# Patient Record
Sex: FEMALE | Race: OTHER | HISPANIC OR LATINO | URBAN - METROPOLITAN AREA
[De-identification: names, ages, dates, MRNs, and addresses within clinical notes are randomized per-mention and may not be internally consistent; named-entity substitution may affect disease eponyms.]

---

## 2018-05-26 ENCOUNTER — EMERGENCY (EMERGENCY)
Facility: HOSPITAL | Age: 33
LOS: 1 days | Discharge: HOME | End: 2018-05-26
Admitting: EMERGENCY MEDICINE
Payer: SELF-PAY

## 2018-05-26 VITALS
HEART RATE: 90 BPM | OXYGEN SATURATION: 98 % | SYSTOLIC BLOOD PRESSURE: 132 MMHG | RESPIRATION RATE: 16 BRPM | TEMPERATURE: 98 F | DIASTOLIC BLOOD PRESSURE: 71 MMHG

## 2018-05-26 PROCEDURE — 99283 EMERGENCY DEPT VISIT LOW MDM: CPT

## 2018-05-26 RX ORDER — DICLOXACILLIN SODIUM 500 MG/1
1 CAPSULE ORAL
Qty: 28 | Refills: 0 | OUTPATIENT
Start: 2018-05-26 | End: 2018-06-01

## 2018-05-26 NOTE — ED PROVIDER NOTE - CARE PLAN
Principal Discharge DX:	Mastitis  Assessment and plan of treatment:	Take medication as prescribed. Continue to breast feed. Warm compresses to area. Rest, drink plenty of fluids.  Advance activity as tolerated.  Continue all previously prescribed medications as directed. You can use motrin 600mg every 6-8 hours for pain or fever, and/or Tylenol 650 mg every 4 hours for pain/fever. Follow up with your primary care physician in 48-72 hours- bring copies of your results.  Return to the emergency department for chest pain, shortness of breath, dizziness, or worsening, concerning, or persistent symptoms.

## 2018-05-26 NOTE — ED PROVIDER NOTE - MEDICAL DECISION MAKING DETAILS
33 yo F here for right breast mastitis in setting of breast feeding. well appearing vss. mild mastitis on exam. will dc with abx and gyn fu.

## 2018-05-26 NOTE — ED PROVIDER NOTE - PHYSICAL EXAMINATION
BREAST: right breast with mild erythema, warmth, and swelling 1 cm below the aerola in the 6:00 position, generalized swelling, mild tenderness to area. no discharge or bleeding or skin dimpling.  L breast WNL  chaperone: rn joyce

## 2018-05-26 NOTE — ED ADULT TRIAGE NOTE - CHIEF COMPLAINT QUOTE
Pt. c/o pain under right breast x 3 days. States she is breast feeding. Denies redness or swelling. Took Tylenol for pain.

## 2018-05-26 NOTE — ED PROVIDER NOTE - OBJECTIVE STATEMENT
33 yo F here for right breast pain x 3 days. pt reports she is breast feeding and has been experiencing right breast pain, swelling, redness. noted tactile fever today. denies bleeding/discharge from area. denies chills vomiting diarrhea cp sob weakness ha dizziness.   pt requesting  translate at bedside.

## 2018-05-26 NOTE — ED PROVIDER NOTE - PLAN OF CARE
Take medication as prescribed. Continue to breast feed. Warm compresses to area. Rest, drink plenty of fluids.  Advance activity as tolerated.  Continue all previously prescribed medications as directed. You can use motrin 600mg every 6-8 hours for pain or fever, and/or Tylenol 650 mg every 4 hours for pain/fever. Follow up with your primary care physician in 48-72 hours- bring copies of your results.  Return to the emergency department for chest pain, shortness of breath, dizziness, or worsening, concerning, or persistent symptoms.

## 2019-01-22 ENCOUNTER — EMERGENCY (EMERGENCY)
Facility: HOSPITAL | Age: 34
LOS: 1 days | Discharge: HOME | End: 2019-01-22
Attending: EMERGENCY MEDICINE | Admitting: EMERGENCY MEDICINE
Payer: SELF-PAY

## 2019-01-22 VITALS
DIASTOLIC BLOOD PRESSURE: 81 MMHG | RESPIRATION RATE: 16 BRPM | SYSTOLIC BLOOD PRESSURE: 137 MMHG | TEMPERATURE: 98 F | HEART RATE: 74 BPM | OXYGEN SATURATION: 99 %

## 2019-01-22 PROCEDURE — 99283 EMERGENCY DEPT VISIT LOW MDM: CPT

## 2019-01-22 RX ORDER — CIPROFLOXACIN LACTATE 400MG/40ML
1 VIAL (ML) INTRAVENOUS
Qty: 14 | Refills: 0 | OUTPATIENT
Start: 2019-01-22 | End: 2019-01-28

## 2019-01-22 RX ORDER — IBUPROFEN 200 MG
3 TABLET ORAL
Qty: 60 | Refills: 0 | OUTPATIENT
Start: 2019-01-22 | End: 2019-01-26

## 2019-01-22 NOTE — ED PROVIDER NOTE - NSFOLLOWUPINSTRUCTIONS_ED_ALL_ED_FT
Tylenol and motrin at home for pain  Take antibiotics as prescribed  Return to ED if symptoms worsen or do not improve

## 2019-01-22 NOTE — ED PROVIDER NOTE - ENMT, MLM
+R ear pain with pinna manipulation and mildly inflamed at the EAC. No pain with mastoid palpation. TMs intact. Normal anatomical lie of the ears. Airway patent, Nasal mucosa clear. Mouth with normal mucosa. Throat has no vesicles, no oropharyngeal exudates and uvula is midline.

## 2019-01-22 NOTE — ED PROVIDER NOTE - MEDICAL DECISION MAKING DETAILS
Pt with otitis externa possibly due to viral vs. bacterial. Due to the pain and exam findings, will give abx drops. Encourage pt to take Motrin for pain and can use cotton wick to absorb any discharge. F/u with PCP and ENT.

## 2019-01-22 NOTE — ED PROVIDER NOTE - OBJECTIVE STATEMENT
32 y/o female with no pertinent PMHx presents to the ED c/o trouble hearing from R ear x4 days with new onset R ear pain x2 days. Pt reports a small amount of secretion from the R ear of unknown color and states pain exacerbates with manipulation of ear. At time of eval, pt also reports difficulty sleeping. Denies fevers, chills, cough, nausea, vomiting, CP, SOB. No history of similar symptoms in the past. Pt states she has been taking 200mg ibuprofen for symptoms with mild improvement of symptoms. No other acute complaints at time of eval. NKDA. PCP: none. 34 y/o female with no pertinent PMHx presents to the ED c/o trouble hearing from R ear x4 days with new onset R ear pain x2 days. Pt reports a small amount of secretion from the R ear of unknown color and states pain exacerbates with manipulation of ear. At time of eval, pt also reports difficulty sleeping. Denies fevers, chills, cough, nausea, vomiting, CP, SOB. No history of similar symptoms in the past. Pt states she has been taking 200mg ibuprofen for symptoms with mild improvement of symptoms. No other acute complaints at time of eval. NKDA.  PCP: none.